# Patient Record
Sex: MALE | Race: BLACK OR AFRICAN AMERICAN | URBAN - METROPOLITAN AREA
[De-identification: names, ages, dates, MRNs, and addresses within clinical notes are randomized per-mention and may not be internally consistent; named-entity substitution may affect disease eponyms.]

---

## 2023-05-16 LAB
CHLAMYDIA TRACH., AMPLIFIED: NEGATIVE
N. GONORRHEA, AMPLIFIED: NEGATIVE

## 2024-01-01 ENCOUNTER — OFFICE VISIT (OUTPATIENT)
Dept: URGENT CARE | Facility: CLINIC | Age: 39
End: 2024-01-01
Payer: MEDICAID

## 2024-01-01 VITALS
BODY MASS INDEX: 26.26 KG/M2 | OXYGEN SATURATION: 96 % | WEIGHT: 183 LBS | HEART RATE: 87 BPM | DIASTOLIC BLOOD PRESSURE: 84 MMHG | TEMPERATURE: 98.1 F | SYSTOLIC BLOOD PRESSURE: 123 MMHG

## 2024-01-01 DIAGNOSIS — R23.8 VESICULAR RASH: Primary | ICD-10-CM

## 2024-01-01 PROCEDURE — 99213 OFFICE O/P EST LOW 20 MIN: CPT | Performed by: PHYSICIAN ASSISTANT

## 2024-01-01 PROCEDURE — 87529 HSV DNA AMP PROBE: CPT

## 2024-01-01 RX ORDER — VALACYCLOVIR HYDROCHLORIDE 1 G/1
1000 TABLET, FILM COATED ORAL 2 TIMES DAILY
Qty: 16 TABLET | Refills: 0 | Status: SHIPPED | OUTPATIENT
Start: 2024-01-01 | End: 2024-01-09

## 2024-01-01 NOTE — PATIENT INSTRUCTIONS
You were prescribed Valtrex. You will not follow the instructions on the bottle. You will take TWO 1,000 mg tablets when you pick the prescription up today, and then TWO 1,000 mg tablets (total of 4,000 mg today) before bed or approximately 12 hours later. The additional medication is if this does not completely clear up the area. We also swabbed the area to ensure that it is HSV. We will inform you of results and further instructions when they are back.

## 2024-01-01 NOTE — PROGRESS NOTES
Subjective   Patient ID: Mahad Marlow is a 38 y.o. male.    Patient reports previous HSV infection and previous treatment with Valtrex. Reports red painful sores to the nose. First started noticing the bumps around 3 days ago. Started on the philtrum.  States that his previous infections were on the lip, the philtrum, and inside the nose occasionally as well. States that the area was tingling prior to the dots coming up. They did blister and drain clear. States that he was sick with a URI prior to these coming. The bumps are tender to the touch.         The following portions of the chart were reviewed this encounter and updated as appropriate:  Allergies  Meds  Problems  Med Hx  Surg Hx  Fam Hx         Review of Systems   All other systems reviewed and are negative.      Objective     Physical Exam  Vitals reviewed.   Constitutional:       General: He is awake.      Appearance: Normal appearance. He is well-developed.   HENT:      Head: Normocephalic and atraumatic.      Nose:        Comments: On the philtrum, there is one vesicular lesion and three vesicular lesions noted on the bridge of the nose (see circled areas on diagram). No active drainage or bleeding. Tender to the touch. Slight erythema surrounding the lesions.   Cardiovascular:      Rate and Rhythm: Normal rate.   Pulmonary:      Effort: Pulmonary effort is normal.   Musculoskeletal:      Cervical back: Full passive range of motion without pain.      Right lower leg: No edema.      Left lower leg: No edema.   Skin:     General: Skin is warm and dry.      Findings: No lesion or rash.   Neurological:      General: No focal deficit present.      Mental Status: He is alert and oriented to person, place, and time.      Cranial Nerves: No facial asymmetry.      Motor: Motor function is intact.      Gait: Gait is intact.   Psychiatric:         Attention and Perception: Attention normal.         Mood and Affect: Mood and affect normal.        Assessment/Plan   Diagnoses and all orders for this visit:  Vesicular rash  -     valACYclovir (Valtrex) 1 gram tablet; Take 1 tablet (1,000 mg) by mouth 2 times a day for 8 days.  -     HSV PCR    Patient education provided to patient and documented in AVS. Red flag symptoms reviewed with patient and all questions answered. Patient or parent/guardian verbalized understanding and agreement with care plan as above. All in office testing reviewed with patient. If symptoms worsen or do not improve, patient is to follow up with PCP or report to the ER.

## 2024-01-02 LAB
HSV1 DNA SKIN QL NAA+PROBE: DETECTED
HSV2 DNA SKIN QL NAA+PROBE: NOT DETECTED

## 2024-06-10 ENCOUNTER — APPOINTMENT (OUTPATIENT)
Dept: PRIMARY CARE | Facility: CLINIC | Age: 39
End: 2024-06-10
Payer: MEDICAID